# Patient Record
Sex: FEMALE | URBAN - METROPOLITAN AREA
[De-identification: names, ages, dates, MRNs, and addresses within clinical notes are randomized per-mention and may not be internally consistent; named-entity substitution may affect disease eponyms.]

---

## 2018-12-17 ENCOUNTER — APPOINTMENT (RX ONLY)
Dept: URBAN - METROPOLITAN AREA CLINIC 41 | Facility: CLINIC | Age: 68
Setting detail: DERMATOLOGY
End: 2018-12-17

## 2018-12-17 DIAGNOSIS — Z41.9 ENCOUNTER FOR PROCEDURE FOR PURPOSES OTHER THAN REMEDYING HEALTH STATE, UNSPECIFIED: ICD-10-CM

## 2018-12-17 PROCEDURE — ? BOTOX

## 2019-01-21 ENCOUNTER — APPOINTMENT (RX ONLY)
Dept: URBAN - METROPOLITAN AREA CLINIC 41 | Facility: CLINIC | Age: 69
Setting detail: DERMATOLOGY
End: 2019-01-21

## 2019-01-21 DIAGNOSIS — L81.1 CHLOASMA: ICD-10-CM

## 2019-01-21 DIAGNOSIS — Z41.9 ENCOUNTER FOR PROCEDURE FOR PURPOSES OTHER THAN REMEDYING HEALTH STATE, UNSPECIFIED: ICD-10-CM

## 2019-01-21 PROCEDURE — ? OTHER

## 2019-01-21 PROCEDURE — ? BOTOX

## 2019-01-21 PROCEDURE — ? COUNSELING

## 2019-01-21 PROCEDURE — ? PRODUCT LINE (PHARMACEUTICALS)

## 2019-01-21 ASSESSMENT — LOCATION DETAILED DESCRIPTION DERM: LOCATION DETAILED: LEFT CENTRAL MALAR CHEEK

## 2019-01-21 ASSESSMENT — LOCATION ZONE DERM: LOCATION ZONE: FACE

## 2019-01-21 ASSESSMENT — LOCATION SIMPLE DESCRIPTION DERM: LOCATION SIMPLE: LEFT CHEEK

## 2019-01-21 NOTE — PROCEDURE: PRODUCT LINE (PHARMACEUTICALS)
Product 42 Units: 0
Product 10 Price (In Dollars - Numeric Only, No Special Characters Or $): 50.00
Render Product Pricing In Note: Yes
Name Of Product 3: Clobetasol Solution
Product 7 Application Directions: Apply affected area QD to BID
Product 25 Price (In Dollars - Numeric Only, No Special Characters Or $): 0.00
Name Of Product 10: Actinic keratosis gel
Product 2 Application Directions: Apply affected area QD-BID
Name Of Product 5: Rosacea triple gel
Name Of Product 12: Imiquimod
Product 9 Application Directions: Apply to face QHS
Product 4 Application Directions: Apply to areas of dark pigmentation once to twice daily
Name Of Product 7: Seborrheic Dermatitis Cream
Product 6 Application Directions: Apply to face nightly
Product 13 Application Directions: Apply 1-2 pumps 1 time weekly at night, increase daily as tolerated
Name Of Product 9: Tretinoin 0.05%
Detail Level: Zone
Name Of Product 4: Melasma Emulsion
Product 1 Application Directions: Apply affected area QHS
Product 1 Units: 1
Product 3 Application Directions: Wash affected area of scalp once daily
Name Of Product 11: Tretinoin 0.025%
Name Of Product 6: Tretinoin 0.1% cream
Product 5 Application Directions: Apply to face twice daily with moisturizer
Name Of Product 13: Tazarotene 0.05%
Name Of Product 1: Melasma emulsion 8 %
Product 12 Application Directions: Apply to affected area once a day x 2 weeks, stop treating for 2 weeks then retreat for 2 weeks
Name Of Product 8: Acne Triple Gel

## 2019-01-21 NOTE — PROCEDURE: BOTOX
Expiration Date (Month Year): 05/2021
Inferior Lateral Orbicularis Oculi Units: 0
Lot #: [id number]
Additional Area 1 Units: 25
Detail Level: Detailed
Consent: Written consent obtained. Risks include but not limited to lid/brow ptosis, bruising, swelling, diplopia, temporary effect, incomplete chemical denervation.
Additional Area 1 Location: forehead, lateral eyebrows, crows feet
Additional Area 2 Location: lateral brows
Dilution (U/0.1 Cc): 1
Price (Use Numbers Only, No Special Characters Or $): 883
Post-Care Instructions: Patient instructed to not lie down for 4 hours and limit physical activity for 24 hours. Patient instructed not to travel by airplane for 48 hours.

## 2019-05-20 ENCOUNTER — APPOINTMENT (RX ONLY)
Dept: URBAN - METROPOLITAN AREA CLINIC 41 | Facility: CLINIC | Age: 69
Setting detail: DERMATOLOGY
End: 2019-05-20

## 2019-05-20 DIAGNOSIS — Z41.9 ENCOUNTER FOR PROCEDURE FOR PURPOSES OTHER THAN REMEDYING HEALTH STATE, UNSPECIFIED: ICD-10-CM

## 2019-05-20 PROCEDURE — ? BOTOX

## 2019-05-20 PROCEDURE — ? OTHER (COSMETIC)

## 2019-05-20 NOTE — PROCEDURE: OTHER (COSMETIC)
Detail Level: Detailed
Other (Free Text): Discussed blepharoplasty with Dr. Irene or cosmetic surgeon [facility] to reduce hooded eyelids.

## 2019-05-20 NOTE — PROCEDURE: BOTOX
Additional Area 3 Units: 0
Additional Area 1 Location: forehead, lateral eyebrows, glabella
Expiration Date (Month Year): 08/2021
Detail Level: Detailed
Price (Use Numbers Only, No Special Characters Or $): 725
Lot #: [id number]
Dilution (U/0.1 Cc): 1
Additional Area 2 Location: glabella lateral brows and orbital rims
Consent: Written consent obtained. Risks include but not limited to lid/brow ptosis, bruising, swelling, diplopia, temporary effect, incomplete chemical denervation.
Post-Care Instructions: Patient instructed to not lie down for 4 hours and limit physical activity for 24 hours. Patient instructed not to travel by airplane for 48 hours.
Additional Area 1 Units: 30

## 2019-06-16 NOTE — PROCEDURE: OTHER
Detail Level: Simple
Note Text (......Xxx Chief Complaint.): This diagnosis correlates with the
Other (Free Text): Discussed at next visit patient should purchase 45 units
Other

## 2019-12-09 ENCOUNTER — APPOINTMENT (RX ONLY)
Dept: URBAN - METROPOLITAN AREA CLINIC 41 | Facility: CLINIC | Age: 69
Setting detail: DERMATOLOGY
End: 2019-12-09

## 2019-12-09 DIAGNOSIS — Z41.9 ENCOUNTER FOR PROCEDURE FOR PURPOSES OTHER THAN REMEDYING HEALTH STATE, UNSPECIFIED: ICD-10-CM

## 2019-12-09 PROCEDURE — ? BOTOX

## 2019-12-09 PROCEDURE — ? OTHER

## 2019-12-09 NOTE — PROCEDURE: OTHER
Detail Level: Detailed
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 Discussed to schedule a cosmetic consult for fillers.
Note Text (......Xxx Chief Complaint.): This diagnosis correlates with the

## 2019-12-09 NOTE — PROCEDURE: BOTOX
Show Lateral Platysmal Band Units: Yes
Inferior Lateral Orbicularis Oculi Units: 0
Additional Area 2 Units: 30
Detail Level: Detailed
Price (Use Numbers Only, No Special Characters Or $): 390
Show Right And Left Brow Units: No
Lot #: [id number]
Additional Area 2 Location: glabella, forehead, lateral brows, crows feet
Additional Area 3 Location: Glabella forehead orbital rims and chin
Expiration Date (Month Year): 05/2022
Additional Area 1 Location: forehead, lateral eyebrows, glabella, periorbital rims
Consent: Written consent obtained. Risks include but not limited to lid/brow ptosis, bruising, swelling, diplopia, temporary effect, incomplete chemical denervation.
Dilution (U/0.1 Cc): 0.5
Post-Care Instructions: Patient instructed to not lie down for 4 hours and limit physical activity for 24 hours. Patient instructed not to travel by airplane for 48 hours.